# Patient Record
Sex: MALE | Race: OTHER | HISPANIC OR LATINO | URBAN - METROPOLITAN AREA
[De-identification: names, ages, dates, MRNs, and addresses within clinical notes are randomized per-mention and may not be internally consistent; named-entity substitution may affect disease eponyms.]

---

## 2017-12-24 ENCOUNTER — EMERGENCY (EMERGENCY)
Facility: HOSPITAL | Age: 27
LOS: 0 days | Discharge: HOME | End: 2017-12-24
Attending: EMERGENCY MEDICINE
Payer: MEDICAID

## 2017-12-24 VITALS
RESPIRATION RATE: 18 BRPM | DIASTOLIC BLOOD PRESSURE: 76 MMHG | TEMPERATURE: 98 F | OXYGEN SATURATION: 100 % | HEART RATE: 88 BPM | SYSTOLIC BLOOD PRESSURE: 133 MMHG

## 2017-12-24 VITALS
TEMPERATURE: 98 F | WEIGHT: 220.02 LBS | HEART RATE: 99 BPM | HEIGHT: 71 IN | OXYGEN SATURATION: 99 % | RESPIRATION RATE: 19 BRPM

## 2017-12-24 DIAGNOSIS — S60.949A UNSPECIFIED SUPERFICIAL INJURY OF UNSPECIFIED FINGER, INITIAL ENCOUNTER: ICD-10-CM

## 2017-12-24 DIAGNOSIS — S62.306A UNSPECIFIED FRACTURE OF FIFTH METACARPAL BONE, RIGHT HAND, INITIAL ENCOUNTER FOR CLOSED FRACTURE: ICD-10-CM

## 2017-12-24 DIAGNOSIS — M79.641 PAIN IN RIGHT HAND: ICD-10-CM
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
## 2017-12-24 PROCEDURE — 26600 TREAT METACARPAL FRACTURE: CPT | Mod: 54

## 2017-12-24 PROCEDURE — 99284 EMERGENCY DEPT VISIT MOD MDM: CPT | Mod: 25

## 2017-12-24 PROCEDURE — 73130 X-RAY EXAM OF HAND: CPT | Mod: 26,RT

## 2017-12-24 RX ORDER — OXYCODONE AND ACETAMINOPHEN 5; 325 MG/1; MG/1
1 TABLET ORAL ONCE
Refills: 0 | Status: DISCONTINUED | OUTPATIENT
Start: 2017-12-24 | End: 2017-12-24

## 2017-12-24 RX ADMIN — OXYCODONE AND ACETAMINOPHEN 1 TABLET(S): 5; 325 TABLET ORAL at 05:23

## 2017-12-24 RX ADMIN — OXYCODONE AND ACETAMINOPHEN 1 TABLET(S): 5; 325 TABLET ORAL at 03:54

## 2017-12-24 NOTE — ED PROVIDER NOTE - PRINCIPAL DIAGNOSIS
Closed nondisplaced fracture of fifth metacarpal bone, unspecified portion of metacarpal, unspecified laterality, initial encounter

## 2017-12-24 NOTE — ED ADULT NURSE NOTE - OBJECTIVE STATEMENT
Assumed care of patient at this time with complaint of injury to finger, patient sts he fell on his hand no LOC, swelling to right pinky Assumed care of patient at this time with complaint of injury to finger, patient sts he fell on his hand no LOC, swelling to right pinky, no abrasions

## 2017-12-24 NOTE — ED PROVIDER NOTE - MEDICAL DECISION MAKING DETAILS
imaging reviewed and discussed with patient and Dr. Moreno. fracture splinted and patient given percocet. patient now discharged with care instructions and will follow up with Dr. Moreno.

## 2017-12-24 NOTE — ED PROVIDER NOTE - OBJECTIVE STATEMENT
Pertinent PMH/PSH/FHx/SHx and Review of Systems contained within:  28 yo m with no PMH presents in ED c/o right hand pain s/p falling on top of hand. No loc, no head trauma. No aggravating or relieving factors, No fever/chills, No photophobia/eye pain/changes in vision, No ear pain/sore throat/dysphagia, No chest pain/palpitations, no SOB/cough/wheeze/stridor, No abdominal pain, No N/V/D, no dysuria/frequency/discharge, No neck/back pain, no rash, no changes in neurological status/function.

## 2017-12-24 NOTE — ED PROVIDER NOTE - CARE PLAN
Principal Discharge DX:	Closed nondisplaced fracture of fifth metacarpal bone, unspecified portion of metacarpal, unspecified laterality, initial encounter